# Patient Record
Sex: FEMALE | Race: WHITE | URBAN - METROPOLITAN AREA
[De-identification: names, ages, dates, MRNs, and addresses within clinical notes are randomized per-mention and may not be internally consistent; named-entity substitution may affect disease eponyms.]

---

## 2018-04-10 DIAGNOSIS — H53.10 SUBJECTIVE VISUAL DISTURBANCE: Primary | ICD-10-CM

## 2018-04-11 ENCOUNTER — OFFICE VISIT (OUTPATIENT)
Dept: OPHTHALMOLOGY | Facility: CLINIC | Age: 23
End: 2018-04-11
Attending: OPHTHALMOLOGY
Payer: COMMERCIAL

## 2018-04-11 DIAGNOSIS — H52.203 MYOPIA OF BOTH EYES WITH ASTIGMATISM: ICD-10-CM

## 2018-04-11 DIAGNOSIS — H52.13 MYOPIA OF BOTH EYES WITH ASTIGMATISM: ICD-10-CM

## 2018-04-11 DIAGNOSIS — H53.10 SUBJECTIVE VISUAL DISTURBANCE: Primary | ICD-10-CM

## 2018-04-11 PROCEDURE — G0463 HOSPITAL OUTPT CLINIC VISIT: HCPCS | Mod: ZF

## 2018-04-11 PROCEDURE — 92133 CPTRZD OPH DX IMG PST SGM ON: CPT | Mod: ZF | Performed by: OPHTHALMOLOGY

## 2018-04-11 PROCEDURE — 92083 EXTENDED VISUAL FIELD XM: CPT | Mod: ZF | Performed by: OPHTHALMOLOGY

## 2018-04-11 PROCEDURE — 92015 DETERMINE REFRACTIVE STATE: CPT | Mod: ZF

## 2018-04-11 RX ORDER — OLANZAPINE 10 MG/1
10 TABLET ORAL
COMMUNITY
Start: 2017-10-05

## 2018-04-11 ASSESSMENT — VISUAL ACUITY
OD_CC+: +/-
CORRECTION_TYPE: GLASSES
OD_PH_CC: 20/40
METHOD: SNELLEN - LINEAR
OD_CC: 20/50
OS_CC: 20/60
OS_CC+: -2

## 2018-04-11 ASSESSMENT — CONF VISUAL FIELD
OS_SUPERIOR_NASAL_RESTRICTION: 3
OD_INFERIOR_TEMPORAL_RESTRICTION: 3
OD_SUPERIOR_NASAL_RESTRICTION: 3
METHOD: COUNTING FINGERS
OS_SUPERIOR_TEMPORAL_RESTRICTION: 3
OS_INFERIOR_NASAL_RESTRICTION: 3
OD_INFERIOR_NASAL_RESTRICTION: 3
OS_INFERIOR_TEMPORAL_RESTRICTION: 3
OD_SUPERIOR_TEMPORAL_RESTRICTION: 3

## 2018-04-11 ASSESSMENT — CUP TO DISC RATIO
OD_RATIO: 0.3
OS_RATIO: 0.3

## 2018-04-11 ASSESSMENT — REFRACTION_MANIFEST
OD_CYLINDER: +0.75
OD_AXIS: 045
OS_CYLINDER: SPHERE
OS_SPHERE: -4.50
OD_SPHERE: -4.25

## 2018-04-11 ASSESSMENT — TONOMETRY
OS_IOP_MMHG: 21
OD_IOP_MMHG: 15
IOP_METHOD: TONOPEN

## 2018-04-11 ASSESSMENT — REFRACTION_WEARINGRX
OD_AXIS: 011
OS_CYLINDER: +0.50
OD_SPHERE: -3.50
OS_AXIS: 154
OS_SPHERE: -3.50
OD_CYLINDER: +0.50

## 2018-04-11 ASSESSMENT — SLIT LAMP EXAM - LIDS
COMMENTS: NORMAL
COMMENTS: NORMAL

## 2018-04-11 NOTE — MR AVS SNAPSHOT
After Visit Summary   4/11/2018    Kathy Enamorado    MRN: [id number]           Patient Information     Date Of Birth          1995        Visit Information        Provider Department      4/11/2018 12:30 PM Mark Kowalski MD [facility]        Today's Diagnoses     Subjective visual disturbance    -  1    Myopia of both eyes with astigmatism           Follow-ups after your visit        Additional Services     ERG Referral       To see provider same day-[facility]  Patient coming from [place], [place]                  Follow-up notes from your care team     Return in about 4 months (around 8/11/2018) for ffERG/mfERG, to see elizabeth.      Future tests that were ordered for you today     Open Future Orders        Priority Expected Expires Ordered    Fundus Photos OU (both eyes) Routine  10/13/2019 4/11/2018    Fundus Autofluorescence Image (FAF) OU (both eyes) Routine  10/13/2019 4/11/2018    Visual Field Zapata OU (both eyes) Routine  10/13/2019 4/11/2018    FFERG OU (both eyes) Routine  10/13/2019 4/11/2018    MFERG OU (both eyes) Routine  10/13/2019 4/11/2018            Who to contact     Please call your clinic at [phone] to:    Ask questions about your health    Make or cancel appointments    Discuss your medicines    Learn about your test results    Speak to your doctor            Additional Information About Your Visit        MyChart Information     PatientKeepert is an electronic gateway that provides easy, online access to your medical records. With Mirror42, you can request a clinic appointment, read your test results, renew a prescription or communicate with your care team.     To sign up for PatientKeepert visit the website at [email or web address]   You will be asked to enter the access code listed below, as well as some personal information. Please follow the directions to create your username and password.     Your access code is: [email or web address]  Expires: 7/5/2018  6:31 AM      Your access code will  in 90 days. If you need help or a new code, please contact your [facility] or call [phone] for assistance.        Care EveryWhere ID     This is your Care EveryWhere ID. This could be used by other organizations to access your [place] medical records  [id number]         Blood Pressure from Last 3 Encounters:   No data found for BP    Weight from Last 3 Encounters:   No data found for Wt              We Performed the Following     ERG Referral     Glaucoma Top OU     OCT Optic Nerve RNFL Spectralis OU (both eyes)        Primary Care Provider Office Phone # Fax #    Fani Shelton [phone]       [facility] [address]  [place] [address]        Equal Access to Services     LUCIO JAMES : Hadii yessenia ku hadasho Soomaali, waaxda luqadaha, qaybta kaalmada adeegyada, waxay idiin jeaneth gu . So [facility] [phone].    ATENCIÓN: Si habla español, tiene a perez disposición servicios gratuitos de asistencia lingüística. Ll[facility] [phone].    We comply with applicable federal civil rights laws and [place] laws. We do not discriminate on the basis of race, color, national origin, age, disability, sex, sexual orientation, or gender identity.            Thank you!     Thank you for choosing EYE CLINIC  for your care. Our goal is always to provide you with excellent care. Hearing back from our patients is one way we can continue to improve our services. Please take a few minutes to complete the written survey that you may receive in the mail after your visit with us. Thank you!             Your Updated Medication List - Protect others around you: Learn how to safely use, store and throw away your medicines at www.disposemymeds.org.          This list is accurate as of 18  1:56 PM.  Always use your most recent med list.                   Brand Name Dispense Instructions for use Diagnosis    OLANZapine 10  MG tablet    zyPREXA     10 mg

## 2018-04-11 NOTE — NURSING NOTE
Chief Complaints and History of Present Illnesses   Patient presents with     Consult For     decreased vision     HPI    Affected eye(s):  Both   Symptoms:           Do you have eye pain now?:  No      Comments:  Pt states she came to the  about 11 years ago and was dx with night blindness and tunnel vision. Was not given a dx of disease or cause but was told it would gradually get worse and she would eventually be blind by the age of 27. States that her perifpheral vision has gotten much worse in the last six months. Pt also states she has an ache in the eyes sometimes and sometimes they burn.  Joanna Plaza COA 12:20 PM April 11, 2018

## 2018-04-11 NOTE — PROGRESS NOTES
CC: tunnel vision  HPI:  Kathy Enamorado is a 22 year old female with history of possible retinitis pigmentosa. She states that [facility] and was told that she would be blind by the time she's 27. She has been following in [place], [place] with Dr. Billy Irvin She is unaware of medical history on her father's side but not ocular history on mom's side. She notes tunnel vision which she feels is getting worse. She notes extreme difficulty seeing in dim light.     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    Discussed with Dr. Kate Kowalski MD  PGY3, Dept of Ophthalmology  Pager [phone]      Teaching statement:  Complete documentation of historical and exam elements from today's encounter can be found in the full encounter summary report (not reduplicated in this progress note). I personally obtained the chief complaint(s) and history of present illness.  I confirmed and edited as necessary the review of systems, past medical/surgical history, family history, social history, and examination findings as documented by others; and I  examined the patient myself. I personally reviewed the relevant tests, images, and reports as documented above.     I formulated and edited as necessary the assessment and plan and discussed the findings and management plan with the patient and family.    Dasia Love MD  Comprehensive Ophthalmology & Ocular Pathology  Department of Ophthalmology and Visual Neurosciences  [email or web address]  Pager [phone]

## 2018-04-17 DIAGNOSIS — Z13.5 ENCOUNTER FOR SCREENING FOR EYE AND EAR DISORDERS: Primary | ICD-10-CM

## 2018-04-17 DIAGNOSIS — Z53.9 ERRONEOUS ENCOUNTER--DISREGARD: Primary | ICD-10-CM

## 2018-04-17 DIAGNOSIS — H53.10 SUBJECTIVE VISUAL DISTURBANCE: ICD-10-CM

## 2018-05-23 ENCOUNTER — ALLIED HEALTH/NURSE VISIT (OUTPATIENT)
Dept: OPHTHALMOLOGY | Facility: CLINIC | Age: 23
End: 2018-05-23
Attending: OPHTHALMOLOGY
Payer: COMMERCIAL

## 2018-05-23 DIAGNOSIS — H52.203 MYOPIA OF BOTH EYES WITH ASTIGMATISM: ICD-10-CM

## 2018-05-23 DIAGNOSIS — H52.13 MYOPIA OF BOTH EYES WITH ASTIGMATISM: ICD-10-CM

## 2018-05-23 DIAGNOSIS — Z13.5 ENCOUNTER FOR SCREENING FOR EYE AND EAR DISORDERS: ICD-10-CM

## 2018-05-23 DIAGNOSIS — H53.10 SUBJECTIVE VISUAL DISTURBANCE: Primary | ICD-10-CM

## 2018-05-23 PROCEDURE — 40000269 ZZH STATISTIC NO CHARGE FACILITY FEE: Mod: ZF

## 2018-05-23 PROCEDURE — 92275 FFERG OU (BOTH EYES): CPT | Mod: ZF

## 2018-05-23 PROCEDURE — G0463 HOSPITAL OUTPT CLINIC VISIT: HCPCS | Mod: 25,ZF

## 2018-05-23 ASSESSMENT — TONOMETRY
OS_IOP_MMHG: 16
IOP_METHOD: ICARE
OD_IOP_MMHG: 17

## 2018-05-23 ASSESSMENT — CONF VISUAL FIELD
OS_SUPERIOR_NASAL_RESTRICTION: 3
OD_INFERIOR_NASAL_RESTRICTION: 1
OS_INFERIOR_NASAL_RESTRICTION: 3
OD_SUPERIOR_NASAL_RESTRICTION: 3
OS_INFERIOR_TEMPORAL_RESTRICTION: 1
OD_INFERIOR_TEMPORAL_RESTRICTION: 1
OD_SUPERIOR_TEMPORAL_RESTRICTION: 3

## 2018-05-23 ASSESSMENT — SLIT LAMP EXAM - LIDS
COMMENTS: NORMAL
COMMENTS: NORMAL

## 2018-05-23 ASSESSMENT — REFRACTION_WEARINGRX
SPECS_TYPE: SVL
OD_AXIS: 011
OD_SPHERE: -3.50
OD_SPHERE: -3.50
OS_CYLINDER: +0.50
OS_CYLINDER: +0.50
OD_CYLINDER: +0.50
OD_CYLINDER: +0.50
OS_SPHERE: -3.50
OS_AXIS: 154
OS_SPHERE: -3.50
OD_AXIS: 011
OS_AXIS: 154

## 2018-05-23 ASSESSMENT — VISUAL ACUITY
OD_CC+: +1
OS_PH_CC: 20/60-1
OS_PH_CC: 20/60-1
OS_CC: 20/70
METHOD: SNELLEN - LINEAR
OS_CC+: -1
OD_CC+: +1
OS_CC: 20/70
METHOD: SNELLEN - LINEAR
OD_CC: 20/60
OD_PH_CC: 20/40-3
OS_CC+: -1
OD_CC: 20/60
OD_PH_CC: 20/40-3

## 2018-05-23 ASSESSMENT — CUP TO DISC RATIO
OS_RATIO: 0.3
OD_RATIO: 0.3

## 2018-05-23 NOTE — MR AVS SNAPSHOT
After Visit Summary   2018    Kathy Enamorado    MRN: [id number]           Patient Information     Date Of Birth          1995        Visit Information        Provider Department      2018 10:00 AM Chaparrita Henderson MD [facility]         Follow-ups after your visit        Follow-up notes from your care team     Return for next available with Dr. Casillas, possible GVF/functional VF.      Who to contact     Please call your clinic at [phone] to:    Ask questions about your health    Make or cancel appointments    Discuss your medicines    Learn about your test results    Speak to your doctor            Additional Information About Your Visit        MyChart Information     Expedit.ust is an electronic gateway that provides easy, online access to your medical records. With [employer], you can request a clinic appointment, read your test results, renew a prescription or communicate with your care team.     To sign up for Expedit.ust visit the website at [email or web address]   You will be asked to enter the access code listed below, as well as some personal information. Please follow the directions to create your username and password.     Your access code is: [email or web address]  Expires: 2018  6:31 AM     Your access code will  in 90 days. If you need help or a new code, please contact your [facility] or call [phone] for assistance.        Care EveryWhere ID     This is your Care EveryWhere ID. This could be used by other organizations to access your [place] medical records  [id number]         Blood Pressure from Last 3 Encounters:   No data found for BP    Weight from Last 3 Encounters:   No data found for Wt              Today, you had the following     No orders found for display       Primary Care Provider Office Phone # Fax #    Fani Shelton [phone]       [facility] [address] [facility]  [address]        Equal Access to Services     [place] [facility] : Hadii aad ku hadnarenwilliam Luannekaren, wayoanda ludedepacoha, qayblion grossorlandofaawd espana. So [facility] [phone].    ATENCIÓN: Si habla español, tiene a perez disposición servicios gratuitos de asistencia lingüística. Llame al [phone].    We comply with applicable federal civil rights laws and [place] laws. We do not discriminate on the basis of race, color, national origin, age, disability, sex, sexual orientation, or gender identity.            Thank you!     Thank you for choosing EYE CLINIC  for your care. Our goal is always to provide you with excellent care. Hearing back from our patients is one way we can continue to improve our services. Please take a few minutes to complete the written survey that you may receive in the mail after your visit with us. Thank you!             Your Updated Medication List - Protect others around you: Learn how to safely use, store and throw away your medicines at www.disposemymeds.org.          This list is accurate as of 5/23/18  1:19 PM.  Always use your most recent med list.                   Brand Name Dispense Instructions for use Diagnosis    OLANZapine 10 MG tablet    zyPREXA     10 mg

## 2018-05-23 NOTE — MR AVS SNAPSHOT
After Visit Summary   2018    Kathy Enamorado    MRN: [id number]           Patient Information     Date Of Birth          1995        Visit Information        Provider Department      2018 8:30 AM [facility]        Today's Diagnoses     Encounter for screening for eye and ear disorders           Follow-ups after your visit        Who to contact     Please call your clinic at [phone] to:    Ask questions about your health    Make or cancel appointments    Discuss your medicines    Learn about your test results    Speak to your doctor            Additional Information About Your Visit        MyChart Information     [employer] is an electronic gateway that provides easy, online access to your medical records. With [employer], you can request a clinic appointment, read your test results, renew a prescription or communicate with your care team.     To sign up for PROVENTIX SYSTEMSt visit the website at [email or web address]   You will be asked to enter the access code listed below, as well as some personal information. Please follow the directions to create your username and password.     Your access code is: [email or web address]  Expires: 2018  6:31 AM     Your access code will  in 90 days. If you need help or a new code, please contact your [facility] or call [phone] for assistance.        Care EveryWhere ID     This is your Care EveryWhere ID. This could be used by other organizations to access your [place] medical records  [id number]         Blood Pressure from Last 3 Encounters:   No data found for BP    Weight from Last 3 Encounters:   No data found for Wt              We Performed the Following     FFERG OU (both eyes)        Primary Care Provider Office Phone # Fax #    Fani Shelton [phone]       [place] [facility] [address] [facility] [address]        Equal Access to Services     LUCIO JAMES AH:  Hadii yessenia hector Sohyacinthali, waaxda luqadaha, qaybta kaalingrid hernandez, fawad meiin hayaaroseanna reyessuresh murray lajenroseanna ethan. So [facility] [phone].    ATENCIÓN: Si habla español, tiene a perez disposición servicios gratuitos de asistencia lingüística. Llame al [phone].    We comply with applicable federal civil rights laws and [place] laws. We do not discriminate on the basis of race, color, national origin, age, disability, sex, sexual orientation, or gender identity.            Thank you!     Thank you for choosing EYE CLINIC  for your care. Our goal is always to provide you with excellent care. Hearing back from our patients is one way we can continue to improve our services. Please take a few minutes to complete the written survey that you may receive in the mail after your visit with us. Thank you!             Your Updated Medication List - Protect others around you: Learn how to safely use, store and throw away your medicines at www.disposemymeds.org.          This list is accurate as of 5/23/18 10:44 AM.  Always use your most recent med list.                   Brand Name Dispense Instructions for use Diagnosis    OLANZapine 10 MG tablet    zyPREXA     10 mg

## 2018-05-23 NOTE — NURSING NOTE
Chief Complaints and History of Present Illnesses   Patient presents with     Consult For     Possible RP      HPI    Additional Referring Providers:  Dr. Kate SUMMERS [facility]   Symptoms:        Unknown duration    Frequency:  Constant          Comments:  Sent here at request of Dr. Kate SUMMERS. Pt complains of tunnel vision, long standing since age 12. Notes that she has had test ERG age 12, not told any eye disease. Complains of spots in vision, comes and goes. Denies any pain. Notes that lights flicker a lot. PERLA RAMIREZ, COA 9:12 AM 05/23/2018

## 2018-05-23 NOTE — PROGRESS NOTES
2018    STANDARD ERG REPORT    Referring: Dr. Dasia Love  CC: Dr. Mark Kowalski      RE:  Kathy Enamorado  MRN:  [id number]  :  1995    ERG Date:  2018    CLINICAL HISTORY: Kathy Enamorado is a 22 year old female with history of tunnel vision and nyctalopia. She states that she was seen at the [facility] >10 years ago and was told that she would be blind by the time she's 27. She has been following in [place], [place] with Dr. Billy Irvin She is unaware of medical history on her father's side but not ocular history on mom's side. She notes tunnel vision which she feels is getting worse. She notes extreme difficulty seeing in dim light.     IMPRESSION:   1. Mild, non-specific electroretinogram changes of unknown clinical significance.   2. No significant raffy or cone photoreceptor loss in either eye.                Visual Acuity Right Eye : 20/60+1, PH 20/40-3      w/gls, -3.50 + 0.50x011    Visual Acuity Left Eye : 20/70-1, PH 20/60-1        w/gls, -3.50 + 0.50x154                    ALL AVERAGED  Data for Full-Field ERG Right Eye   Left Eye    Dark-Adapted Patient Normal Patient   0.01 ERG (raffy) amplitude( v)  213* 95.4-392.1 392*   0.01 ERG (raffy) implicit time(ms) 95.7* 71.7-100.1 91.5*   MMMMM      3.0 ERG (combined) a-wave amplitude( v) -167 -260.0 to -28.0 -274   3.0 ERG (combined) a-wave implicit time(ms) 14.1 12.2-21.1 15   3.0 ERG (combined) b-wave amplitude( v) 290 136.5-400.6 469   3.0 ERG (combined) b-wave implicit time(ms) 49.9 29.2-48.8 49.9   MMMMM      3.0 Oscillatory Potentials   Present     Light-Adapted      3.0 Flicker (30-Hz) amplitude( v) 109 55.9-187.0 151   3.0 Flicker (30-Hz) implicit time(ms) 30 20.6-28.6 29.1         3.0 ERG (cone) a-wave amplitude( v) -19 -62.2 to -10.6 -34   3.0 ERG (cone) a-wave implicit time(ms) 14.1 15.0-17.5 14.1   3.0 ERG (cone) b-wave amplitude( v) 143 69.9-205.4 227   3.0 ERG (cone) b-wave implicit time(ms) 30.8 24.6-31.0 30.8           *=manipulated cursors    INTERPRETATION:     The electroretinogram was performed according to ISCEV standards using ESPION E3 system and DTL fiber recording electrodes. The patient tolerated the testing well. The waveforms are fairly reproducible and well formed. The responses in between both eyes were similar.  The normative values provided above represent the 95% confidence limits for a normal individual the age of the patient. The patient s responses are averaged.  In scotopic conditions, the raffy specific responses were normal in both eyes.  The maximal response, a combined raffy and cone responses were essentially normal and the implicit time was normal in both eyes, except for delayed implicit time for the b-wave responses both eyes.    In light adapted conditions, the 30-Hz flicker response has a normal amplitude and the implicit time is delayed in both eyes. The single photopic flash response are normal.    Conclusion:  This electroretinogram  Shows mild, non-specific electroretinogram changes of unknown clinical significance. This represents a likely normal ERG with the exception of  delayed on the implicit time in the flicker and combined b-wave responses both eyes of unclear significance. This is a nonspecific finding. Certainly, there is no significant loss of raffy or cone photoreceptors.    Although the delayed in the implicit time could correspond to early retina dystrophy, overall the electroretinogram  findings do not correlate with the clinical findings and the severe constriction of the visual fields.    Clinical correlation is recommended.       I would recommend a repeated electroretinogram in a couple of years if there continues to be concern regarding a possible retinal dystrophy.     Faviola Forman, thank you for the opportunity to provide electrophysiologic services for this patient.  Please do not hesitate to call if there should be any questions regarding these results.    Chaparrita Henderson,  MD     Retina Service   Department of Ophthalmology and Visual Neurosciences   [facility]  Phone: [phone]   Fax: [phone]

## 2018-05-23 NOTE — PROGRESS NOTES
CC: tunnel visual acuity since age 12. Trouble with night visual acuity   HPI: Kathy Enamorado is a  22 year old year-old patient referred by Dr Love for evaluaiton of possible Retinitis pigmentosa     Patient had been following with Dr. Alexis Irvin in [place], who was concerned for retinitis pigmentosa and recommended referral back to [facility]. Saw Dr. Kowalski/Kate in 4/2018 and was referred to get ffERG and to retina clinic.     Mom notes attention was drawn to patient's symptoms at age 12 (in 2007) when she was running into objects and her doctor suggested that she have an eye exam. She saw Dr. Rodriguez in [place] and Dr. Wolf in our pediatric ophthalmology department. [facility] 20/20 both eyes at that time.     Her main symptom is tunnel vision in both eyes since around age 12. She reports extreme difficulty with seeing at nighttime or in dark conditions. Also reports intermittent spots in the vision of both eyes. She reports eye strain. Occ flickers in both eyes when looking at bright lights or different designs.    She reports headaches every other day behind the eyes. Mom reports patient has had bilateral hearing loss since infancy and chronic tinnitus. No TVOs.     POHx: ?RP  PMHx: Anxiety, depression, PTSD, acid reflux, hearing loss, history concussions, [place],   Current Medications: Zyprexa  FHx: Unknown father's side, no ocular history mother  PSHx: ear tubes, adenoidectomy, tonsillectomy  SH: She previously played basketball, volleyball, and gymnastics but now does not participate in any sports. She has graduated from high school and is interested in going to college for psychology. She went to online high school and had a child at age 17. Patient does not drive due to her vision (never learned how to drive).      Retinal Imaging:  OCT  5-23-18  RE: normal  LE: normal    Optos 5-23-18  OD- peripheral hypopigmentation  OS- peripheral hypopigmentation    Autofluorescence 5-23-18  Right eye  normal  Left eye normal    electroretinogram (ERG) 9/17/07: Electrophysiologic responses are robust and well formed. Both the individual raffy and cone responses are normal. However, I do not suspect retinitis pigmentosa, photoreceptor or retinal pigment epithelial dysfunction. Clinical correlation is recommended.  electrooculogram  2017: within normal limits     electroretinogram  5/23/18:    This electroretinogram  Shows mild, non-specific electroretinogram changes of unknown clinical significance. This represents a likely normal ERG with the exception of  delayed on the implicit time in the flicker and combined b-wave responses both eyes of unclear significance. This is a nonspecific finding. Certainly, there is no significant loss of raffy or cone photoreceptors.    Although the delayed in the implicit time could correspond to a very early retina dystrophy, overall the electroretinogram  findings do not correlate with the clinical findings and the severe constriction of the visual fields.   This electroretinogram  Does not support the diagnosis of Retinitis pigmentosa     Assessment & Plan:    Subjective visual disturbance, both eyes   -patient complains of progressively worsening constriction of visual fields in both eyes since age 12   -ffERG is not completely normal but does not correlate with extensive loss on visual field   -Optical Coherence Tomography, Optos, and FAF show no significant abnormalities   -mom reports history of MRI ~10yrs ago which was unremarkable   -referral to Dr. Casillas in neuro-ophthalmology for further evaluation - may consider MRI and GVF/functional VF     Myopia/astigmatism, both eyes    -VAcc 20/40-3 right eye, 20/60-1 with ph today   -recommend manifest refraction at next visit        Carlyn Thomas MD  Ophthalmology PGY-3    ~~~~~~~~~~~~~~~~~~~~~~~~~~~~~~~~~~   Complete documentation of historical and exam elements from today's encounter can be found in the full encounter  summary report (not reduplicated in this progress note).  I personally obtained the chief complaint(s) and history of present illness.  I confirmed and edited as necessary the review of systems, past medical/surgical history, family history, social history, and examination findings as documented by others; and I examined the patient myself.  I personally reviewed the relevant tests, images, and reports as documented above.  I personally reviewed the ophthalmic test(s) associated with this encounter, agree with the interpretation(s) as documented by the resident/fellow, and have edited the corresponding report(s) as necessary.   I formulated and edited as necessary the assessment and plan and discussed the findings and management plan with the patient and family    Chaparrita Henderson MD  .  Retina Service   Department of Ophthalmology and Visual Neurosciences   [facility]  Phone: [phone]   Fax: [phone]

## 2018-05-23 NOTE — NURSING NOTE
Returns for [facility] and NEW to [place].  DEMI swanson/Dex Love 04-11-18.  Accompanied by mother.  No interval changes.  Prev ERG+EOG() done 09-.  See Allscripts for Outside Outpt notes and Clinic notes 2007.

## 2018-05-23 NOTE — LETTER
May 23, 2018    STANDARD ERG REPORT    Referring: Dr. Dasia Love    RE:  Kathy Enamorado  MRN:  [id number]  :  1995    ERG Date:  2018    CLINICAL HISTORY: Kathy Enamorado is a 22-year-old female with history of tunnel vision and nyctalopia. She states that she was seen at the [facility] >10 years ago and was told that she would be blind by the time she's 27. She has been following in [place], [place] with Dr. Billy Irvin. She is unaware of medical history on her father's side but not ocular history on mom's side. She notes tunnel vision which she feels is getting worse. She notes extreme difficulty seeing in dim light.     IMPRESSION:   1. Mild, non-specific electroretinogram changes of unknown clinical significance   2. No significant raffy or cone photoreceptor loss in either eye                Visual Acuity Right Eye: 20/60+1, PH 20/40-3      w/gls, -3.50 +0.50 x 011    Visual Acuity Left Eye:  20/70-1, PH 20/60-1        w/gls, -3.50 +0.50 x 154                                        ALL AVERAGED  Data for Full-Field ERG Right Eye   Left Eye    Dark-Adapted Patient Normal Patient   0.01 ERG (raffy) amplitude( v)  213* 95.4-392.1 392*   0.01 ERG (raffy) implicit time(ms) 95.7* 71.7-100.1 91.5*   MMMMM      3.0 ERG (combined) a-wave amplitude( v) -167 -260.0 to -28.0 -274   3.0 ERG (combined) a-wave implicit time(ms) 14.1 12.2-21.1 15   3.0 ERG (combined) b-wave amplitude( v) 290 136.5-400.6 469   3.0 ERG (combined) b-wave implicit time(ms) 49.9 29.2-48.8 49.9   MMMMM      3.0 Oscillatory Potentials   Present     Light-Adapted      3.0 Flicker (30-Hz) amplitude( v) 109 55.9-187.0 151   3.0 Flicker (30-Hz) implicit time(ms) 30 20.6-28.6 29.1         3.0 ERG (cone) a-wave amplitude( v) -19 -62.2 to -10.6 -34   3.0 ERG (cone) a-wave implicit time(ms) 14.1 15.0-17.5 14.1   3.0 ERG (cone) b-wave amplitude( v) 143 69.9-205.4 227   3.0 ERG (cone) b-wave implicit time(ms) 30.8 24.6-31.0 30.8                         *=manipulated cursors    INTERPRETATION:  This electroretinogram was performed according to ISCEV standards using the ESPION E3 system and DTL fiber-recording electrodes. The patient tolerated the testing well. The waveforms are fairly reproducible and well formed. The responses in between both eyes were similar. The normative values provided above represent the 95% confidence limits for a normal individual the age of the patient. The patient s responses are averaged.    In scotopic conditions, the raffy-specific responses were normal in both eyes. The maximal response, a combined raffy and cone response, was essentially normal and the implicit time was normal in both eyes, except for delayed implicit time for the b-wave responses in both eyes.    In light-adapted conditions, the 30-Hz flicker response has a normal amplitude and the implicit time is delayed in both eyes. The single photopic flash responses are normal.    CONCLUSION:  This electroretinogram shows mild, non-specific electroretinogram changes of unknown clinical significance. This represents a likely normal ERG with the exception of delay in the implicit time in the flicker and combined b-wave responses both eyes, of unclear significance. This is a nonspecific finding. Certainly, there is no significant loss of raffy or cone photoreceptors.  Although the delay in the implicit time could correspond to early retinal dystrophy, overall the electroretinogram findings do not correlate with the clinical findings and the severe constriction of the visual fields. Clinical correlation is recommended.       I would recommend a repeat electroretinogram in a couple of years if there continues to be concern regarding a possible retinal dystrophy.     Dasia, thank you for the opportunity to provide electrophysiologic services for this patient.  Please do not hesitate to call if there should be any questions regarding these results.    Sincerely,    Chaparrita ROMANO  MD Santiago     Retina Service   Department of Ophthalmology and Visual Neurosciences   [facility]  Phone: [phone]   Fax: [phone]        cc:   Dr. Mark Kowalski

## 2018-05-23 NOTE — LETTER
5/23/2018       RE: Kathy Enamorado  [address]  [place] [address]     Dear Dasia,    Thank you for referring your patient, Kathy Enamorado, to the EYE CLINIC at [facility]. Please see a copy of my visit note below.       CC: tunnel visual acuity since age 12. Trouble with night visual acuity   HPI: Kathy Enamorado is a  22 year old year-old patient referred by Dr Love for evaluaiton of possible Retinitis pigmentosa     Patient had been following with Dr. Alexis Irvin in [place], who was concerned for retinitis pigmentosa and recommended referral back to [facility]. Saw Dr. Kowalski/Kate in 4/2018 and was referred to get ffERG and to retina clinic.     Mom notes attention was drawn to patient's symptoms at age 12 (in 2007) when she was running into objects and her doctor suggested that she have an eye exam. She saw Dr. Rodriguez in [place] and Dr. Wolf in our pediatric ophthalmology department. VA 20/20 both eyes at that time.     Her main symptom is tunnel vision in both eyes since around age 12. She reports extreme difficulty with seeing at nighttime or in dark conditions. Also reports intermittent spots in the vision of both eyes. She reports eye strain. Occ flickers in both eyes when looking at bright lights or different designs.    She reports headaches every other day behind the eyes. Mom reports patient has had bilateral hearing loss since infancy and chronic tinnitus. No TVOs.     POHx: ?RP  PMHx: Anxiety, depression, PTSD, acid reflux, hearing loss, history concussions, [place],   Current Medications: Zyprexa  FHx: Unknown father's side, no ocular history mother  PSHx: ear tubes, adenoidectomy, tonsillectomy  SH: She previously played basketball, volleyball, and gymnastics but now does not participate in any sports. She has graduated from high school and is interested in going to college for psychology. She went to online high school and had a child at  age 17. Patient does not drive due to her vision (never learned how to drive).      Retinal Imaging:  OCT  5-23-18  RE: normal  LE: normal    Optos 5-23-18  OD- peripheral hypopigmentation  OS- peripheral hypopigmentation    Autofluorescence 5-23-18  Right eye normal  Left eye normal    electroretinogram (ERG) 9/17/07: Electrophysiologic responses are robust and well formed. Both the individual raffy and cone responses are normal. However, I do not suspect retinitis pigmentosa, photoreceptor or retinal pigment epithelial dysfunction. Clinical correlation is recommended.  electrooculogram  2017: within normal limits     electroretinogram  5/23/18:    This electroretinogram  Shows mild, non-specific electroretinogram changes of unknown clinical significance. This represents a likely normal ERG with the exception of  delayed on the implicit time in the flicker and combined b-wave responses both eyes of unclear significance. This is a nonspecific finding. Certainly, there is no significant loss of raffy or cone photoreceptors.    Although the delayed in the implicit time could correspond to a very early retina dystrophy, overall the electroretinogram  findings do not correlate with the clinical findings and the severe constriction of the visual fields.   This electroretinogram  Does not support the diagnosis of Retinitis pigmentosa     Assessment & Plan:    Subjective visual disturbance, both eyes   -patient complains of progressively worsening constriction of visual fields in both eyes since age 12   -ffERG is not completely normal but does not correlate with extensive loss on visual field   -Optical Coherence Tomography, Optos, and FAF show no significant abnormalities   -mom reports history of MRI ~10yrs ago which was unremarkable   -referral to Dr. Casillas in neuro-ophthalmology for further evaluation - may consider MRI and GVF/functional VF     Myopia/astigmatism, both eyes    -VAcc 20/40-3 right eye, 20/60-1 with  ph today   -recommend manifest refraction at next visit        Carlyn Thomas MD  Ophthalmology PGY-3    ~~~~~~~~~~~~~~~~~~~~~~~~~~~~~~~~~~  Complete documentation of historical and exam elements from today's encounter can be found in the full encounter summary report (not reduplicated in this progress note).  I personally obtained the chief complaint(s) and history of present illness.  I confirmed and edited as necessary the review of systems, past medical/surgical history, family history, social history, and examination findings as documented by others; and I examined the patient myself.  I personally reviewed the relevant tests, images, and reports as documented above.  I personally reviewed the ophthalmic test(s) associated with this encounter, agree with the interpretation(s) as documented by the resident/fellow, and have edited the corresponding report(s) as necessary.   I formulated and edited as necessary the assessment and plan and discussed the findings and management plan with the patient and family. Chaparrita Henderson MD    Again, thank you for allowing me to participate in the care of your patient.      Sincerely,    Chaparrita Henderson MD     Retina Service   Department of Ophthalmology and Visual Neurosciences   [facility]  Phone:  [phone]   Fax:  [phone]

## 2018-07-23 DIAGNOSIS — H53.10 SUBJECTIVE VISUAL DISTURBANCE: Primary | ICD-10-CM

## 2018-07-25 ENCOUNTER — OFFICE VISIT (OUTPATIENT)
Dept: OPHTHALMOLOGY | Facility: CLINIC | Age: 23
End: 2018-07-25
Attending: OPHTHALMOLOGY
Payer: COMMERCIAL

## 2018-07-25 DIAGNOSIS — H53.10 SUBJECTIVE VISUAL DISTURBANCE: ICD-10-CM

## 2018-07-25 DIAGNOSIS — H53.40 VISUAL FIELD DEFECT: Primary | ICD-10-CM

## 2018-07-25 PROCEDURE — 92083 EXTENDED VISUAL FIELD XM: CPT | Mod: ZF | Performed by: OPHTHALMOLOGY

## 2018-07-25 PROCEDURE — G0463 HOSPITAL OUTPT CLINIC VISIT: HCPCS | Mod: ZF | Performed by: TECHNICIAN/TECHNOLOGIST

## 2018-07-25 ASSESSMENT — EXTERNAL EXAM - RIGHT EYE: OD_EXAM: NORMAL

## 2018-07-25 ASSESSMENT — VISUAL ACUITY
CORRECTION_TYPE: GLASSES
OD_CC+: -2
METHOD: SNELLEN - LINEAR
OD_CC: 20/25
OS_CC: 20/30

## 2018-07-25 ASSESSMENT — TONOMETRY
OS_IOP_MMHG: 9
IOP_METHOD: ICARE
OD_IOP_MMHG: 10

## 2018-07-25 ASSESSMENT — CUP TO DISC RATIO
OS_RATIO: 0.2
OD_RATIO: 0.2

## 2018-07-25 ASSESSMENT — SLIT LAMP EXAM - LIDS
COMMENTS: NORMAL
COMMENTS: NORMAL

## 2018-07-25 ASSESSMENT — CONF VISUAL FIELD
OS_INFERIOR_NASAL_RESTRICTION: 3
OD_INFERIOR_TEMPORAL_RESTRICTION: 3
OD_SUPERIOR_TEMPORAL_RESTRICTION: 3
OD_INFERIOR_NASAL_RESTRICTION: 3
OD_SUPERIOR_NASAL_RESTRICTION: 3
OS_SUPERIOR_TEMPORAL_RESTRICTION: 3
OS_SUPERIOR_NASAL_RESTRICTION: 3
OS_INFERIOR_TEMPORAL_RESTRICTION: 3

## 2018-07-25 ASSESSMENT — REFRACTION_WEARINGRX
OD_SPHERE: -3.50
SPECS_TYPE: SVL
OS_CYLINDER: +0.50
OD_AXIS: 011
OS_AXIS: 154
OS_SPHERE: -3.50
OD_CYLINDER: +0.50

## 2018-07-25 ASSESSMENT — EXTERNAL EXAM - LEFT EYE: OS_EXAM: NORMAL

## 2018-07-25 NOTE — LETTER
"2018    RE: Kathy Enamorado  : 1995  MRN: [id number]    Dear Dr. Henderson,    Thank you for referring your patient, Kathy Enamorado, to my neuro-ophthalmology clinic recently.  After a thorough neuro-ophthalmic history and examination, I came to the following conclusions:     1. Subjective visual disturbance  2. Bilateral \"tunnel vision\" without a neuro-ophthalmologic correlate.  Finger-nose-finger perimetry today showed significantly expanded visual field from traditional count fingers confrontation visual fields and the automated kinetic visual fields showed findings suggestive of non-organic vision loss.  I suspect there is a major component of non-organic vision loss in this case     Kathy Enamorado is a pleasant 23 year old White female who presents to my neuro-ophthalmology clinic today who was referred by Dr. Henderson for tunnel vision in both eyes since age 12. Reports running into things as a child, which is how her family knew she was having vision problems. Previously following with Dr. Ann Irvin in [place].  Tunnel vision has gotten worse over the past five years.  She thinks her tunnel vision worsens when she is stressed. Occasionally when she looks at things they look \"wobbly\" like they're moving and maybe double, but if she blinks the image comes together.  This happens a few times a week.  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  She does not drive and has never had a license.  Occasionally notices blurry vision that gets better towards the end of the day and sometimes has a sensation of a eyelash in her eye, but nothing is actually there.  Denies pain, flashes, floaters.    Work up for retinitis pigmentosa with Dr. Henderson was negative.  No family history of ocular disease on mother's side, unknown father's side.  Patient is partially deaf in both ears.  History of two concussions around age 11-13.  Denies other significant past " medical history.    Visual acuity is 20/25 in the right eye and 20/30 in the left eye.  Pupils react briskly to light without afferent pupillary defect.  Visual fields to confrontation were severely constricted in both eyes.  Color plate testing is 10/11 in the right eye and 8/11 in the left eye.      Extraocular movements are full and intraocular pressure is normal.  CN II-XII intact.  Finger-nose-finger perimetry was essentially full in both eyes and dramatically expanded compared to traditional confrontation visual fields I performed earlier in the exam.    Automated kinetic visual fields showed severely constricted visual fields in both eyes with collapsing of isopters and non-physiologic isopter crossing in both eyes.  These fields were not compatible with her ability to navigate through the clinic nor my finger-nose-finger perimetry.      Full field electroretinogram with interpretation by Dr. Henderson on 5/23/18 did not show a functional correlate of raffy dysfunction to explain the patient's visual field loss.    OCT of the optic nerve head revealed normal retinal nerve fiber layer in both eyes today and at last visit with Dr. Henderson.     Outside MRI brain from 2015 was unremarkable. I reviewed these images today and agree that there are no findings to explain the patient's constricted visual fields nor any pathology that would be visually significant.    In summary, this 23-year-old patient with severely constricted visual fields on traditional confrontation visual field testing and automated kinetic visual fields has no structural correlate to explain her vision loss.  Her optic nerves and retinal periphery are normal in both eyes.  Her OCT shows normal retinal nerve fiber layer in both eyes as compared to age-matched controls.  Subtle features of her formal visual field testing today suggests a component of nonorganic visual field loss.  Her confrontation visual field testing expanded dramatically to  essentially normal when checked via finger-nose-finger perimetry rather than traditional count fingers perimetry.  In conclusion this patient has had a thorough workup looking for organic causes of visual field loss.  This included an essentially unremarkable full-field ERG and a 2015 MRI brain which I reviewed and was read by radiology as normal.    I reassured this patient that there is no indication for any serious cause for her visual field loss and that I suspect there is a major component of stress related vision loss.  This means that at some point her subjective sense of her vision should improve.  The patient was satisfied with this answer.    I did not make a follow-up appointment, but I would be happy to see the patient back in the future should any new neuro-ophthalmic concern arise.  The patient will follow up with her primary eye care provider closer to home unless there is concern for a significant change to her vision in which case she is always welcome to return for further evaluation.    I confirmed with the patient today that she does not meet legal criteria for driving based upon her vision and must not drive.  She agreed.      Again, thank you for trusting me with the care of your patient.  For further exam details, please feel free to contact our office for additional records.  If you wish to contact me regarding this patient please email me at [facility] [email or web address] or give my clinic a call to arrange a phone conversation.    Sincerely,    Clive Casillas MD  , Neuro-Ophthalmology and Adult Strabismus  Department of Ophthalmology and Visual Neurosciences  [facility]    DX: functional vision loss

## 2018-07-25 NOTE — PROGRESS NOTES
"   1. Subjective visual disturbance  2. Bilateral \"tunnel vision\" without a neuro-ophthalmologic correlate.  Finger-nose-finger perimetry today showed significantly expanded visual field from traditional count fingers confrontation visual fields and the automated kinetic visual fields showed findings suggestive of non-organic vision loss.  I suspect there is a major component of non-organic vision loss in this case     Kathy Enamorado is a pleasant 23 year old White female who presents to my neuro-ophthalmology clinic today who was referred by Dr. Henderson for tunnel vision in both eyes since age 12. Reports running into things as a child, which is how her family knew she was having vision problems. Previously following with Dr. Ann Irvin in [place].  Tunnel vision has gotten worse over the past five years.  She thinks her tunnel vision worsens when she is stressed. Occasionally when she looks at things they look \"wobbly\" like they're moving and maybe double, but if she blinks the image comes together.  This happens a few times a week.  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  She does not drive and has never had a license.  Occasionally notices blurry vision that gets better towards the end of the day and sometimes has a sensation of a eyelash in her eye, but nothing is actually there.  Denies pain, flashes, floaters.    Work up for retinitis pigmentosa with Dr. Henderson was negative.  No family history of ocular disease on mother's side, unknown father's side.  Patient is partially deaf in both ears.  History of two concussions around age 11-13.  Denies other significant past medical history.    Visual acuity is 20/25 in the right eye and 20/30 in the left eye.  Pupils react briskly to light without afferent pupillary defect.  Visual fields to confrontation were severely constricted in both eyes.  Color plate testing is 10/11 in the right eye and 8/11 in the " left eye.      Extraocular movements are full and intraocular pressure is normal.  CN II-XII intact.  Finger-nose-finger perimetry was essentially full in both eyes and dramatically expanded compared to traditional confrontation visual fields I performed earlier in the exam.    Automated kinetic visual fields showed severely constricted visual fields in both eyes with collapsing of isopters and non-physiologic isopter crossing in both eyes.  These fields were not compatible with her ability to navigate through the clinic nor my finger-nose-finger perimetry.      Full field electroretinogram with interpretation by Dr. Henderson on 5/23/18 did not show a functional correlate of raffy dysfunction to explain the patient's visual field loss.    OCT of the optic nerve head revealed normal retinal nerve fiber layer in both eyes today and at last visit with Dr. Henderson.     Outside MRI brain from 2015 was unremarkable. I reviewed these images today and agree that there are no findings to explain the patient's constricted visual fields nor any pathology that would be visually significant.    In summary, this 23-year-old patient with severely constricted visual fields on traditional confrontation visual field testing and automated kinetic visual fields has no structural correlate to explain her vision loss.  Her optic nerves and retinal periphery are normal in both eyes.  Her OCT shows normal retinal nerve fiber layer in both eyes as compared to age-matched controls.  Subtle features of her formal visual field testing today suggests a component of nonorganic visual field loss.  Her confrontation visual field testing expanded dramatically to essentially normal when checked via finger-nose-finger perimetry rather than traditional count fingers perimetry.  In conclusion this patient has had a thorough workup looking for organic causes of visual field loss.  This included an essentially unremarkable full-field ERG and a 2015 MRI  brain which I reviewed and was read by radiology as normal.    I reassured this patient that there is no indication for any serious cause for her visual field loss and that I suspect there is a major component of stress related vision loss.  This means that at some point her subjective sense of her vision should improve.  The patient was satisfied with this answer.    I did not make a follow-up appointment, but I would be happy to see the patient back in the future should any new neuro-ophthalmic concern arise.  The patient will follow up with her primary eye care provider closer to home unless there is concern for a significant change to her vision in which case she is always welcome to return for further evaluation.    I confirmed with the patient today that she does not meet legal criteria for driving based upon her vision and must not drive.  She agreed.         I spent a total of 45 minutes face to face with Kathy Enamorado during today's office visit.  Over 50% of this time was spent counseling the patient and/or coordinating care regarding her visual field loss of unclear cause.    Complete documentation of historical and exam elements from today's encounter can be found in the full encounter summary report (not reduplicated in this progress note).  I personally obtained the chief complaint(s) and history of present illness.  I confirmed and edited as necessary the review of systems, past medical/surgical history, family history, social history, and examination findings as documented by others; and I examined the patient myself.  I personally reviewed the relevant tests, images, and reports as documented above.  I formulated and edited as necessary the assessment and plan and discussed the findings and management plan with the patient and family.  I personally reviewed the ophthalmic test(s) associated with this encounter, agree with the interpretation(s) as documented by the resident/fellow, and have edited  the corresponding report(s) as necessary.     MD Savana Hong, MS4

## 2018-07-25 NOTE — MR AVS SNAPSHOT
After Visit Summary   2018    Kathy Enamorado    MRN: [id number]           Patient Information     Date Of Birth          1995        Visit Information        Provider Department      2018 8:00 AM [facility]        Today's Diagnoses     Subjective visual disturbance           Follow-ups after your visit        Who to contact     Please call your clinic at [phone] to:    Ask questions about your health    Make or cancel appointments    Discuss your medicines    Learn about your test results    Speak to your doctor            Additional Information About Your Visit        MyChart Information     [employer] is an electronic gateway that provides easy, online access to your medical records. With [employer], you can request a clinic appointment, read your test results, renew a prescription or communicate with your care team.     To sign up for [employer] visit the website at [email or web address]   You will be asked to enter the access code listed below, as well as some personal information. Please follow the directions to create your username and password.     Your access code is: [email or web address]  Expires: 10/9/2018  6:30 AM     Your access code will  in 90 days. If you need help or a new code, please contact your [facility] or call [phone] for assistance.        Care EveryWhere ID     This is your Care EveryWhere ID. This could be used by other organizations to access your [facility] medical records  [id number]         Blood Pressure from Last 3 Encounters:   No data found for BP    Weight from Last 3 Encounters:   No data found for Wt              We Performed the Following     Functional Vision Loss OU     IOP Measurement     OCT Optic Nerve RNFL Spectralis OU (both eyes)        Primary Care Provider Office Phone # Fax #    Fani Shelton [phone]       [facility] [address]  [place] [address]        Equal Access to Services     [facility] ERIKA : Hadii aad ku hadnarenwilliam Popeyeali, waaxda luqadaha, qaybta kaalmada david, fawad long. So [facility] [phone].    ATENCIÓN: Si habla español, tiene a perez disposición servicios gratuitos de asistencia lingüística. Llame al [phone].    We comply with applicable federal civil rights laws and [place] laws. We do not discriminate on the basis of race, color, national origin, age, disability, sex, sexual orientation, or gender identity.            Thank you!     Thank you for choosing [facility]  for your care. Our goal is always to provide you with excellent care. Hearing back from our patients is one way we can continue to improve our services. Please take a few minutes to complete the written survey that you may receive in the mail after your visit with us. Thank you!             Your Updated Medication List - Protect others around you: Learn how to safely use, store and throw away your medicines at www.disposemymeds.org.          This list is accurate as of 7/25/18  9:56 AM.  Always use your most recent med list.                   Brand Name Dispense Instructions for use Diagnosis    OLANZapine 10 MG tablet    zyPREXA     10 mg